# Patient Record
Sex: MALE | Race: WHITE
[De-identification: names, ages, dates, MRNs, and addresses within clinical notes are randomized per-mention and may not be internally consistent; named-entity substitution may affect disease eponyms.]

---

## 2018-09-14 ENCOUNTER — HOSPITAL ENCOUNTER (EMERGENCY)
Dept: HOSPITAL 25 - ED | Age: 58
Discharge: HOME | End: 2018-09-14
Payer: COMMERCIAL

## 2018-09-14 VITALS — SYSTOLIC BLOOD PRESSURE: 112 MMHG | DIASTOLIC BLOOD PRESSURE: 69 MMHG

## 2018-09-14 DIAGNOSIS — F17.200: ICD-10-CM

## 2018-09-14 DIAGNOSIS — F10.10: Primary | ICD-10-CM

## 2018-09-14 LAB
BASOPHILS # BLD AUTO: 0 10^3/UL (ref 0–0.2)
EOSINOPHIL # BLD AUTO: 0.3 10^3/UL (ref 0–0.6)
HCT VFR BLD AUTO: 44 % (ref 42–52)
HGB BLD-MCNC: 15.3 G/DL (ref 14–18)
LYMPHOCYTES # BLD AUTO: 1.8 10^3/UL (ref 1–4.8)
MCH RBC QN AUTO: 35 PG (ref 27–31)
MCHC RBC AUTO-ENTMCNC: 35 G/DL (ref 31–36)
MCV RBC AUTO: 100 FL (ref 80–94)
MONOCYTES # BLD AUTO: 0.5 10^3/UL (ref 0–0.8)
NEUTROPHILS # BLD AUTO: 3.7 10^3/UL (ref 1.5–7.7)
NRBC # BLD AUTO: 0 10^3/UL
NRBC BLD QL AUTO: 0.1
PLATELET # BLD AUTO: 255 10^3/UL (ref 150–450)
RBC # BLD AUTO: 4.36 10^6/UL (ref 4–5.4)
WBC # BLD AUTO: 6.2 10^3/UL (ref 3.5–10.8)

## 2018-09-14 PROCEDURE — 99281 EMR DPT VST MAYX REQ PHY/QHP: CPT

## 2018-09-14 PROCEDURE — 80320 DRUG SCREEN QUANTALCOHOLS: CPT

## 2018-09-14 PROCEDURE — 85025 COMPLETE CBC W/AUTO DIFF WBC: CPT

## 2018-09-14 PROCEDURE — G0480 DRUG TEST DEF 1-7 CLASSES: HCPCS

## 2018-09-14 PROCEDURE — 36415 COLL VENOUS BLD VENIPUNCTURE: CPT

## 2018-09-14 PROCEDURE — 80053 COMPREHEN METABOLIC PANEL: CPT

## 2018-09-14 NOTE — ED
Substance Abuse/Use





- HPI Summary


HPI Summary: 





Patient was sent from cars to ED for EtOH detox.  Patient tested positive for 

EtOH at cars.  Patient denies any pain, symptoms, injury.  Chronic EtOH abuse.  

Patient calm, alert and oriented.





- History Of Current Complaint


Chief Complaint: EDSubstanceAbuse


Stated Complaint: DETOX REQUEST


Time Seen by Provider: 09/14/18 18:12


Hx Obtained From: Patient


Onset/Duration  of Drug/ETOH Abuse: Years


Timing Of Abuse: Daily


Associated Signs And Symptoms: Negative





- Allergies/Home Medications


Allergies/Adverse Reactions: 


 Allergies











Allergy/AdvReac Type Severity Reaction Status Date / Time


 


No Known Allergies Allergy   Verified 09/14/18 18:19











Home Medications: 


 Home Medications





NK [No Home Medications Reported]  09/14/18 [History Confirmed 09/14/18]











PMH/Surg Hx/FS Hx/Imm Hx


Endocrine/Hematology History: 


   Denies: Hx Anticoagulant Therapy


 History: 


   Denies: Hx Dialysis


Neurological History: 


   Denies: Hx CVA





- Immunization History


Immunizations Up to Date: Yes


Infectious Disease History: No


Infectious Disease History: 


   Denies: Traveled Outside the [place] in Last 30 Days





- Social History


Alcohol Use: Daily


Alcohol Amount: pint of vodka/day


Substance Use Type: Reports: Marijuana


Smoking Status (MU): Current Some Day Smoker





Review of Systems


Constitutional: Negative


Eyes: Negative


ENT: Negative


Cardiovascular: Negative


Respiratory: Negative


Gastrointestinal: Negative


Genitourinary: Negative


Musculoskeletal: Negative


Skin: Negative


Neurological: Negative


Psychological: Normal


All Other Systems Reviewed And Are Negative: Yes





Physical Exam





- Summary


Physical Exam Summary: 





No Apparent tremors, diaphoresis.  No observed N/V.  Patient alert and oriented

, responding and behaving appropriately.


Triage Information Reviewed: Yes


Vital Signs On Initial Exam: 


 Initial Vitals











Temp Pulse Resp BP Pulse Ox


 


 98 F   79   18   149/82   98 


 


 09/14/18 14:50  09/14/18 14:50  09/14/18 14:50  09/14/18 14:50  09/14/18 14:50











Vital Signs Reviewed: Yes


Appearance: Positive: Well-Appearing


Skin: Positive: Warm


Head/Face: Positive: Normal Head/Face Inspection


Eyes: Positive: Normal


Neck: Positive: Supple


Respiratory/Lung Sounds: Positive: Clear to Auscultation


Cardiovascular: Positive: Normal


Abdomen Description: Positive: Nontender


Musculoskeletal: Positive: Normal


Neurological: Positive: Normal


Psychiatric: Positive: Normal


AVPU Assessment: Alert





- Brock Coma Scale


Best Eye Response: 4 - Spontaneous


Best Motor Response: 6 - Obeys Commands


Best Verbal Response: 5 - Oriented


Coma Scale Total: 15





Diagnostics





- Vital Signs


 Vital Signs











  Temp Pulse Resp BP Pulse Ox


 


 09/14/18 16:51  98.6 F  80  16  126/81  98


 


 09/14/18 14:50  98 F  79  18  149/82  98














- Laboratory


Lab Results: 


 Lab Results











  09/14/18 09/14/18 Range/Units





  18:13 18:13 


 


WBC  6.2   (3.5-10.8)  10^3/ul


 


RBC  4.36   (4.00-5.40)  10^6/ul


 


Hgb  15.3   (14.0-18.0)  g/dl


 


Hct  44   (42-52)  %


 


MCV  100 H   (80-94)  fL


 


MCH  35 H   (27-31)  pg


 


MCHC  35   (31-36)  g/dl


 


RDW  13   (10.5-15)  %


 


Plt Count  255   (150-450)  10^3/ul


 


MPV  6.8 L   (7.4-10.4)  um3


 


Neut % (Auto)  58.6   (38-83)  %


 


Lymph % (Auto)  28.7   (25-47)  %


 


Mono % (Auto)  7.5 H   (0-7)  %


 


Eos % (Auto)  4.6   (0-6)  %


 


Baso % (Auto)  0.6   (0-2)  %


 


Absolute Neuts (auto)  3.7   (1.5-7.7)  10^3/ul


 


Absolute Lymphs (auto)  1.8   (1.0-4.8)  10^3/ul


 


Absolute Monos (auto)  0.5   (0-0.8)  10^3/ul


 


Absolute Eos (auto)  0.3   (0-0.6)  10^3/ul


 


Absolute Basos (auto)  0   (0-0.2)  10^3/ul


 


Absolute Nucleated RBC  0   10^3/ul


 


Nucleated RBC %  0.1   


 


Sodium   139  (135-145)  mmol/L


 


Potassium   4.1  (3.5-5.0)  mmol/L


 


Chloride   105  (101-111)  mmol/L


 


Carbon Dioxide   28  (22-32)  mmol/L


 


Anion Gap   6  (2-11)  mmol/L


 


BUN   12  (6-24)  mg/dL


 


Creatinine   0.87  (0.67-1.17)  mg/dL


 


Est GFR ( Amer)   109.4  (>60)  


 


Est GFR (Non-Af Amer)   90.4  (>60)  


 


BUN/Creatinine Ratio   13.8  (8-20)  


 


Glucose   86  ()  mg/dL


 


Calcium   8.9  (8.6-10.3)  mg/dL


 


Total Bilirubin   0.40  (0.2-1.0)  mg/dL


 


AST   44 H  (13-39)  U/L


 


ALT   36  (7-52)  U/L


 


Alkaline Phosphatase   70  ()  U/L


 


Total Protein   7.3  (6.4-8.9)  g/dL


 


Albumin   4.5  (3.2-5.2)  g/dL


 


Globulin   2.8  (2-4)  g/dL


 


Albumin/Globulin Ratio   1.6  (1-3)  


 


Serum Alcohol   168 H  (<10)  mg/dL











Result Diagrams: 


 09/14/18 18:13





 09/14/18 18:13


Lab Statement: Any lab studies that have been ordered have been reviewed, and 

results considered in the medical decision making process.





Course/Dx





- Course


Course Of Treatment: Patient was sent from cars to ED for EtOH detox.  Patient 

tested positive for EtOH at cars.  Patient denies any pain, symptoms, injury.  

Chronic EtOH abuse.  Patient calm, alert and oriented.  Physical exam:No 

Apparent tremors, diaphoresis.  No observed N/V.  Patient alert and oriented, 

responding and behaving appropriately.  Vital signs within normal limits.  Labs 

unremarkable.  Patient in no apparent distress.  EtOH level 168 at 18:13.  

Patient observed for 3 hours until under the legal limit.  Clinically sober.  

Still no apparent distress, diaphoresis, N/V, anxiety, evidence of 

hallucinations.  Patient discharged in stable condition.





- Diagnoses


Provider Diagnoses: 


 Alcohol abuse








Discharge





- Sign-Out/Discharge


Documenting (check all that apply): Patient Departure





- Discharge Plan


Condition: Stable


Disposition: HOME


Patient Education Materials:  Abuse of Alcohol (ED)


Referrals: 


No Primary Care Phys,NOPCP [Primary Care Provider] - 


Additional Instructions: 


Follow-up with outpatient resources for alcohol detox.  Return to the ED for 

any new or worsening symptoms





- Billing Disposition and Condition


Condition: STABLE


Disposition: Home